# Patient Record
Sex: FEMALE | Race: WHITE
[De-identification: names, ages, dates, MRNs, and addresses within clinical notes are randomized per-mention and may not be internally consistent; named-entity substitution may affect disease eponyms.]

---

## 2023-09-07 ENCOUNTER — OFFICE VISIT (OUTPATIENT)
Facility: CLINIC | Age: 88
End: 2023-09-07
Payer: MEDICARE

## 2023-09-07 VITALS
WEIGHT: 96.4 LBS | SYSTOLIC BLOOD PRESSURE: 114 MMHG | DIASTOLIC BLOOD PRESSURE: 75 MMHG | OXYGEN SATURATION: 100 % | RESPIRATION RATE: 16 BRPM | TEMPERATURE: 97.4 F | HEART RATE: 64 BPM

## 2023-09-07 DIAGNOSIS — D64.9 ANEMIA, UNSPECIFIED TYPE: ICD-10-CM

## 2023-09-07 DIAGNOSIS — R35.0 INCREASED URINARY FREQUENCY: ICD-10-CM

## 2023-09-07 DIAGNOSIS — T14.8XXA BRUISING: ICD-10-CM

## 2023-09-07 DIAGNOSIS — R19.5 DARK STOOLS: ICD-10-CM

## 2023-09-07 DIAGNOSIS — E55.9 VITAMIN D DEFICIENCY: ICD-10-CM

## 2023-09-07 DIAGNOSIS — R63.4 WEIGHT LOSS: Primary | ICD-10-CM

## 2023-09-07 DIAGNOSIS — R35.1 NOCTURIA: ICD-10-CM

## 2023-09-07 DIAGNOSIS — Z13.220 SCREENING, LIPID: ICD-10-CM

## 2023-09-07 DIAGNOSIS — E53.8 VITAMIN B12 DEFICIENCY: ICD-10-CM

## 2023-09-07 PROCEDURE — 99204 OFFICE O/P NEW MOD 45 MIN: CPT | Performed by: FAMILY MEDICINE

## 2023-09-07 PROCEDURE — 1123F ACP DISCUSS/DSCN MKR DOCD: CPT | Performed by: FAMILY MEDICINE

## 2023-09-07 NOTE — PROGRESS NOTES
Silas Livingston is a 80 y.o. female who presents to the office today for the following:  Chief Complaint   Patient presents with    New Patient       Allergies   Allergen Reactions    Ciprofloxacin     Pcn [Penicillins] Other (See Comments)    Sulfa Antibiotics Other (See Comments)       No current outpatient medications on file. No past medical history on file. No past surgical history on file. Social History     Socioeconomic History    Marital status: Single     Spouse name: Not on file    Number of children: Not on file    Years of education: Not on file    Highest education level: Not on file   Occupational History    Not on file   Tobacco Use    Smoking status: Never    Smokeless tobacco: Never   Substance and Sexual Activity    Alcohol use: Never    Drug use: Never    Sexual activity: Not on file   Other Topics Concern    Not on file   Social History Narrative    Not on file     Social Determinants of Health     Financial Resource Strain: Not on file   Food Insecurity: Not on file   Transportation Needs: Not on file   Physical Activity: Not on file   Stress: Not on file   Social Connections: Not on file   Intimate Partner Violence: Not on file   Housing Stability: Not on file     or  Social History     Tobacco Use   Smoking Status Never   Smokeless Tobacco Never       No family history on file. HPI:  HPI  Patient is here to establish care. Here with daughter in law. Notes that she has been having decreased energy has increased bruising in her left arm. And was found to have weeping in her legs in June. Was evaluated in the hospital and EKG and echo were normal.  Was found to have iron deficiency and was given an iron transfusion and transfusion. Her hemoglobin level was 7. It went down to as low as a 5 and went up to an 8. Colonoscopy was recommended but she declined having it done at her age. She notes that she has been eating 3 meals a day but still continues to lose weight.   .  Also

## 2023-09-13 ENCOUNTER — HOSPITAL ENCOUNTER (OUTPATIENT)
Facility: HOSPITAL | Age: 88
Discharge: HOME | End: 2023-09-16
Payer: MEDICARE

## 2023-09-13 DIAGNOSIS — E55.9 VITAMIN D DEFICIENCY: ICD-10-CM

## 2023-09-13 DIAGNOSIS — R63.4 WEIGHT LOSS: ICD-10-CM

## 2023-09-13 DIAGNOSIS — R35.0 INCREASED URINARY FREQUENCY: ICD-10-CM

## 2023-09-13 DIAGNOSIS — D64.9 ANEMIA, UNSPECIFIED TYPE: ICD-10-CM

## 2023-09-13 DIAGNOSIS — E53.8 VITAMIN B12 DEFICIENCY: ICD-10-CM

## 2023-09-13 DIAGNOSIS — T14.8XXA BRUISING: ICD-10-CM

## 2023-09-13 LAB
ALBUMIN SERPL-MCNC: 2.4 G/DL (ref 3.4–5)
ALBUMIN/GLOB SERPL: 0.6 (ref 0.8–1.7)
ALP SERPL-CCNC: 95 U/L (ref 45–117)
ALT SERPL-CCNC: 12 U/L (ref 13–56)
ANION GAP SERPL CALC-SCNC: 5 MMOL/L (ref 3–18)
APPEARANCE UR: CLEAR
APTT PPP: 33.2 SEC (ref 23–36.4)
AST SERPL-CCNC: 15 U/L (ref 10–38)
BACTERIA URNS QL MICRO: NEGATIVE /HPF
BILIRUB SERPL-MCNC: 0.3 MG/DL (ref 0.2–1)
BILIRUB UR QL: NEGATIVE
BUN SERPL-MCNC: 10 MG/DL (ref 7–18)
BUN/CREAT SERPL: 22 (ref 12–20)
CALCIUM SERPL-MCNC: 8.1 MG/DL (ref 8.5–10.1)
CHLORIDE SERPL-SCNC: 100 MMOL/L (ref 100–111)
CO2 SERPL-SCNC: 29 MMOL/L (ref 21–32)
COLOR UR: YELLOW
CREAT SERPL-MCNC: 0.46 MG/DL (ref 0.6–1.3)
EPITH CASTS URNS QL MICRO: ABNORMAL /LPF (ref 0–5)
ERYTHROCYTE [DISTWIDTH] IN BLOOD BY AUTOMATED COUNT: 23.1 % (ref 11.6–14.5)
FOLATE SERPL-MCNC: 17 NG/ML (ref 3.1–17.5)
GLOBULIN SER CALC-MCNC: 3.7 G/DL (ref 2–4)
GLUCOSE SERPL-MCNC: 106 MG/DL (ref 74–99)
GLUCOSE UR STRIP.AUTO-MCNC: NEGATIVE MG/DL
HCT VFR BLD AUTO: 29.7 % (ref 35–45)
HGB BLD-MCNC: 8.6 G/DL (ref 12–16)
HGB UR QL STRIP: NEGATIVE
INR PPP: 1 (ref 0.9–1.1)
IRON SATN MFR SERPL: 8 % (ref 20–50)
IRON SERPL-MCNC: 12 UG/DL (ref 50–175)
KETONES UR QL STRIP.AUTO: NEGATIVE MG/DL
LEUKOCYTE ESTERASE UR QL STRIP.AUTO: ABNORMAL
MCH RBC QN AUTO: 20.9 PG (ref 24–34)
MCHC RBC AUTO-ENTMCNC: 29 G/DL (ref 31–37)
MCV RBC AUTO: 72.3 FL (ref 78–100)
NITRITE UR QL STRIP.AUTO: NEGATIVE
NRBC # BLD: 0 K/UL (ref 0–0.01)
NRBC BLD-RTO: 0 PER 100 WBC
PH UR STRIP: 7 (ref 5–8)
PLATELET # BLD AUTO: 409 K/UL (ref 135–420)
PMV BLD AUTO: 8.5 FL (ref 9.2–11.8)
POTASSIUM SERPL-SCNC: 4.3 MMOL/L (ref 3.5–5.5)
PROT SERPL-MCNC: 6.1 G/DL (ref 6.4–8.2)
PROT UR STRIP-MCNC: NEGATIVE MG/DL
PROTHROMBIN TIME: 13.6 SEC (ref 11.9–14.7)
RBC # BLD AUTO: 4.11 M/UL (ref 4.2–5.3)
RBC #/AREA URNS HPF: ABNORMAL /HPF (ref 0–5)
SODIUM SERPL-SCNC: 134 MMOL/L (ref 136–145)
SP GR UR REFRACTOMETRY: 1.01 (ref 1–1.03)
TIBC SERPL-MCNC: 156 UG/DL (ref 250–450)
TSH SERPL DL<=0.05 MIU/L-ACNC: 0.48 UIU/ML (ref 0.36–3.74)
UROBILINOGEN UR QL STRIP.AUTO: 1 EU/DL (ref 0.2–1)
VIT B12 SERPL-MCNC: 1105 PG/ML (ref 211–911)
WBC # BLD AUTO: 8.1 K/UL (ref 4.6–13.2)
WBC URNS QL MICRO: ABNORMAL /HPF (ref 0–4)

## 2023-09-13 PROCEDURE — 83540 ASSAY OF IRON: CPT

## 2023-09-13 PROCEDURE — 82306 VITAMIN D 25 HYDROXY: CPT

## 2023-09-13 PROCEDURE — 82746 ASSAY OF FOLIC ACID SERUM: CPT

## 2023-09-13 PROCEDURE — 85730 THROMBOPLASTIN TIME PARTIAL: CPT

## 2023-09-13 PROCEDURE — 84443 ASSAY THYROID STIM HORMONE: CPT

## 2023-09-13 PROCEDURE — 80053 COMPREHEN METABOLIC PANEL: CPT

## 2023-09-13 PROCEDURE — 36415 COLL VENOUS BLD VENIPUNCTURE: CPT

## 2023-09-13 PROCEDURE — 87086 URINE CULTURE/COLONY COUNT: CPT

## 2023-09-13 PROCEDURE — 85027 COMPLETE CBC AUTOMATED: CPT

## 2023-09-13 PROCEDURE — 83550 IRON BINDING TEST: CPT

## 2023-09-13 PROCEDURE — 82607 VITAMIN B-12: CPT

## 2023-09-13 PROCEDURE — 81001 URINALYSIS AUTO W/SCOPE: CPT

## 2023-09-13 PROCEDURE — 85610 PROTHROMBIN TIME: CPT

## 2023-09-14 LAB
25(OH)D3 SERPL-MCNC: 39.1 NG/ML (ref 30–100)
BACTERIA SPEC CULT: NORMAL
SERVICE CMNT-IMP: NORMAL

## 2023-09-15 DIAGNOSIS — D64.9 ANEMIA, UNSPECIFIED TYPE: Primary | ICD-10-CM

## 2023-09-18 ENCOUNTER — TELEPHONE (OUTPATIENT)
Facility: CLINIC | Age: 88
End: 2023-09-18

## 2023-09-18 NOTE — TELEPHONE ENCOUNTER
Pt daughter stated a message was left on Friday with labs results.  Ms Alem Kaba would like to be contacted after 2pm to discuss the labs at [phone]

## 2023-09-22 DIAGNOSIS — E55.9 VITAMIN D DEFICIENCY: Primary | ICD-10-CM

## 2023-09-26 RX ORDER — LEVOTHYROXINE SODIUM 0.1 MG/1
100 TABLET ORAL DAILY
Qty: 30 TABLET | Refills: 5 | Status: SHIPPED | OUTPATIENT
Start: 2023-09-26

## 2023-09-26 RX ORDER — ERGOCALCIFEROL 1.25 MG/1
50000 CAPSULE ORAL WEEKLY
Qty: 12 CAPSULE | Refills: 1 | Status: SHIPPED | OUTPATIENT
Start: 2023-09-26

## 2023-10-03 ENCOUNTER — TELEPHONE (OUTPATIENT)
Facility: CLINIC | Age: 88
End: 2023-10-03

## 2023-10-03 NOTE — TELEPHONE ENCOUNTER
----- Message from Arun Godinez sent at 10/3/2023 10:24 AM EDT -----  Subject: Message to Provider    QUESTIONS  Information for Provider? Patients daughter, Joseph Nur, is wanting a call back   after Jacquelyn's medications have been sent to [facility]. The 2 medication   refill requests have been put in for Gabapentin and Metoprolol.   ---------------------------------------------------------------------------  --------------  Hugo Ji [facility]  [phone]; OK to leave message on voicemail,OK to respond with electronic   message via [employer] portal (only for patients who have registered [employer]   account)  ---------------------------------------------------------------------------  --------------  SCRIPT ANSWERS  Relationship to Patient? Other/Third Party  Representative Name? Joseph Nur   Is the representative on the Communication Release of Information (DANYA)   form in Epic?  Yes

## 2023-10-04 ENCOUNTER — TELEPHONE (OUTPATIENT)
Facility: CLINIC | Age: 88
End: 2023-10-04

## 2023-10-04 DIAGNOSIS — G63 NEUROPATHY ASSOCIATED WITH DYSPROTEINEMIAS (HCC): Primary | ICD-10-CM

## 2023-10-04 DIAGNOSIS — E88.09 NEUROPATHY ASSOCIATED WITH DYSPROTEINEMIAS (HCC): Primary | ICD-10-CM

## 2023-10-04 DIAGNOSIS — I10 PRIMARY HYPERTENSION: ICD-10-CM

## 2023-10-04 RX ORDER — METOPROLOL TARTRATE 50 MG/1
50 TABLET, FILM COATED ORAL DAILY
Qty: 90 TABLET | Refills: 1 | Status: SHIPPED | OUTPATIENT
Start: 2023-10-04 | End: 2023-10-06

## 2023-10-04 RX ORDER — METOPROLOL TARTRATE 50 MG/1
50 TABLET, FILM COATED ORAL DAILY
Qty: 30 TABLET | Refills: 0 | Status: SHIPPED | OUTPATIENT
Start: 2023-10-04 | End: 2023-10-04 | Stop reason: SDUPTHER

## 2023-10-04 RX ORDER — METOPROLOL TARTRATE 50 MG/1
TABLET, FILM COATED ORAL
COMMUNITY
End: 2023-10-04 | Stop reason: SDUPTHER

## 2023-10-04 RX ORDER — METOPROLOL TARTRATE 50 MG/1
50 TABLET, FILM COATED ORAL DAILY
Qty: 90 TABLET | Refills: 0 | Status: CANCELLED | OUTPATIENT
Start: 2023-10-04

## 2023-10-04 NOTE — TELEPHONE ENCOUNTER
----- Message from Medardo Lloyd sent at 10/3/2023  5:03 PM EDT -----  Subject: Refill Request    QUESTIONS  Name of Medication? Other - Metoprolol Succ ER 50 mg  Patient-reported dosage and instructions? 1 tablet daily  How many days do you have left? 0  Preferred Pharmacy? [address] [facility] #[id number]  Pharmacy phone number (if available)? [phone]  Additional Information for Provider? Pt is completely out of this   medication. Please advise.   ---------------------------------------------------------------------------  --------------,  Name of Medication? Other - Gabapentin 300 mg  Patient-reported dosage and instructions? 1 tablet daily  How many days do you have left? 0  Preferred Pharmacy? [address] [place] [address] #[id number]  Pharmacy phone number (if available)? [phone]  Additional Information for Provider? Pt is completely out of this   medication. Please advise.   ---------------------------------------------------------------------------  --------------  CALL BACK INFO  What is the best way for the office to contact you? OK to leave message on   voicemail  Preferred Call Back Phone Number? [phone]  ---------------------------------------------------------------------------  --------------  SCRIPT ANSWERS  Relationship to Patient? Other/Third Party  Representative Name? [employer]  Is the representative on the Communication Release of Information (DANYA)   form in Epic?  Yes

## 2023-10-04 NOTE — TELEPHONE ENCOUNTER
Ms Layla Castañeda, the daughter of this patient is requesting to be contacted by a nurse when the 2 prescriptions she requested via [address] has been placed to [address] so that she will know when to go pick it up. Ms Petar Venegas has been without these medications for 2 days and will need an emergency amount until a prescription can be sent to the original pharmacy [facility]    Ms Susanna Schwab stated that she has to call the office to get info on the status for this patient, that no one is contacting back.  Please follow up when available at [phone] or [phone] please leave a voice mail if Ms Layla Castañeda is not available to answer the call    Pt has a VV scheduled for tomorrow 10/05    Gabapentin   Metoprolol

## 2023-10-05 ENCOUNTER — TELEMEDICINE (OUTPATIENT)
Facility: CLINIC | Age: 88
End: 2023-10-05
Payer: MEDICARE

## 2023-10-05 DIAGNOSIS — R53.81 PHYSICAL DECONDITIONING: Primary | ICD-10-CM

## 2023-10-05 PROCEDURE — 99203 OFFICE O/P NEW LOW 30 MIN: CPT | Performed by: FAMILY MEDICINE

## 2023-10-05 SDOH — ECONOMIC STABILITY: INCOME INSECURITY: HOW HARD IS IT FOR YOU TO PAY FOR THE VERY BASICS LIKE FOOD, HOUSING, MEDICAL CARE, AND HEATING?: NOT HARD AT ALL

## 2023-10-05 SDOH — ECONOMIC STABILITY: FOOD INSECURITY: WITHIN THE PAST 12 MONTHS, YOU WORRIED THAT YOUR FOOD WOULD RUN OUT BEFORE YOU GOT MONEY TO BUY MORE.: NEVER TRUE

## 2023-10-05 SDOH — ECONOMIC STABILITY: FOOD INSECURITY: WITHIN THE PAST 12 MONTHS, THE FOOD YOU BOUGHT JUST DIDN'T LAST AND YOU DIDN'T HAVE MONEY TO GET MORE.: NEVER TRUE

## 2023-10-05 SDOH — ECONOMIC STABILITY: HOUSING INSECURITY
IN THE LAST 12 MONTHS, WAS THERE A TIME WHEN YOU DID NOT HAVE A STEADY PLACE TO SLEEP OR SLEPT IN A SHELTER (INCLUDING NOW)?: NO

## 2023-10-05 ASSESSMENT — PATIENT HEALTH QUESTIONNAIRE - PHQ9
SUM OF ALL RESPONSES TO PHQ QUESTIONS 1-9: 0
SUM OF ALL RESPONSES TO PHQ9 QUESTIONS 1 & 2: 0
1. LITTLE INTEREST OR PLEASURE IN DOING THINGS: 0
SUM OF ALL RESPONSES TO PHQ QUESTIONS 1-9: 0
2. FEELING DOWN, DEPRESSED OR HOPELESS: 0
SUM OF ALL RESPONSES TO PHQ QUESTIONS 1-9: 0
SUM OF ALL RESPONSES TO PHQ QUESTIONS 1-9: 0

## 2023-10-05 NOTE — PROGRESS NOTES
number. #3. Increased urinary frequency  Recommend not drinking too much fluids late at night or after 5. But medications are unlikely to contribute to it. And last urine culture was negative. Discussed foods that may be irritants    4..  Iron deficiency  Follow-up with Dr. Champ Felder hematologist next week. Follow-up in 3 months in regards to nutrition and deconditioning. Jen Santos, was evaluated through a synchronous (real-time) audio-video encounter. The patient (or guardian if applicable) is aware that this is a billable service, which includes applicable co-pays. This Virtual Visit was conducted with patient's (and/or legal guardian's) consent. Patient identification was verified, and a caregiver was present when appropriate. The patient was located at Home: [address] [facility]  Provider was located at [facility] (Appt Dept): [address] [place] [address]  [address]        Total time spent on this encounter:  [address] on 10/5/2023 at 5:23 PM    An electronic signature was used to authenticate this note.

## 2023-10-06 DIAGNOSIS — I10 PRIMARY HYPERTENSION: Primary | ICD-10-CM

## 2023-10-06 RX ORDER — METOPROLOL SUCCINATE 50 MG/1
50 TABLET, EXTENDED RELEASE ORAL DAILY
Qty: 90 TABLET | Refills: 1 | Status: SHIPPED | OUTPATIENT
Start: 2023-10-06 | End: 2023-12-07 | Stop reason: SDUPTHER

## 2023-10-12 ENCOUNTER — HOME HEALTH ADMISSION (OUTPATIENT)
Age: 88
End: 2023-10-12
Payer: MEDICARE

## 2023-10-13 ENCOUNTER — HOME CARE VISIT (OUTPATIENT)
Age: 88
End: 2023-10-13
Payer: MEDICARE

## 2023-10-13 VITALS
HEIGHT: 60 IN | HEART RATE: 60 BPM | RESPIRATION RATE: 18 BRPM | DIASTOLIC BLOOD PRESSURE: 62 MMHG | OXYGEN SATURATION: 93 % | BODY MASS INDEX: 19.83 KG/M2 | TEMPERATURE: 97.4 F | SYSTOLIC BLOOD PRESSURE: 102 MMHG | WEIGHT: 101 LBS

## 2023-10-13 PROCEDURE — G0151 HHCP-SERV OF PT,EA 15 MIN: HCPCS

## 2023-10-13 ASSESSMENT — ENCOUNTER SYMPTOMS
BOWEL INCONTINENCE: 1
DYSPNEA ACTIVITY LEVEL: AFTER AMBULATING 10 - 20 FT
PAIN LOCATION - PAIN QUALITY: SORENESS

## 2023-10-17 ENCOUNTER — HOME CARE VISIT (OUTPATIENT)
Age: 88
End: 2023-10-17
Payer: MEDICARE

## 2023-10-17 PROCEDURE — G0152 HHCP-SERV OF OT,EA 15 MIN: HCPCS

## 2023-10-17 PROCEDURE — G0157 HHC PT ASSISTANT EA 15: HCPCS

## 2023-10-18 VITALS
HEART RATE: 67 BPM | OXYGEN SATURATION: 99 % | TEMPERATURE: 98.4 F | DIASTOLIC BLOOD PRESSURE: 62 MMHG | RESPIRATION RATE: 17 BRPM | SYSTOLIC BLOOD PRESSURE: 98 MMHG

## 2023-10-18 VITALS
DIASTOLIC BLOOD PRESSURE: 50 MMHG | RESPIRATION RATE: 6 BRPM | TEMPERATURE: 97.2 F | SYSTOLIC BLOOD PRESSURE: 95 MMHG | HEART RATE: 62 BPM | OXYGEN SATURATION: 96 %

## 2023-10-19 ENCOUNTER — HOME CARE VISIT (OUTPATIENT)
Age: 88
End: 2023-10-19
Payer: MEDICARE

## 2023-10-19 PROCEDURE — G0157 HHC PT ASSISTANT EA 15: HCPCS

## 2023-10-19 NOTE — HOME HEALTH
SUBJECTIVE: Patient states that she is doing well today. Notes that she has no new complaints or concerns. Denies any falls or changes in medications at this time. CAREGIVER INVOLVEMENT/ASSISTANCE NEEDED FOR: Patient is currently reliant on assistance for completion of most to all ADL's such as cooking, cleaning, bathing and dressing. OBJECTIVE:  See interventions. PATIENT RESPONSE TO TREATMENT:  Pt encouraged by her tolerance to PT today, she was able to participate more in PT than she had previously thought possible. Reported improved confidence following PT today. PATIENT LEVEL OF UNDERSTANDING OF EDUCATION PROVIDED: Pt provided education on importance of HEP and how often to complete to increase strength and decrease overall stiffness. Educated on signs and sx of infection and steps to take if one were to arise. Educated on importance of decreasing chance and bed sore and to make sure she is performing weight shifting every 1-2 hours. ASSESSMENT OF PROGRESS TOWARD GOALS: PAtient was seen for PT follow up session for LE strengthening, gait training and transfer training. Gait completed inside of home with use of FWW on level surfaces >100+ ft before rest breakn was required. Patient perfoming all aspects of bed mobility with MOD I at this time. HOME EXERCISE PROGRAM: Patient given written instructions on HEP and how often to complete to maintain compliance. With gait training to be completed once every hour with supervision from son. THE FOLLOWING DISCHARGE PLANNING WAS DISCUSSED WITH THE PATIENT/CAREGIVER: Patient to be D/C from [address] [place] [address],[address] PT when all goals have been met or progressed well towards. PLAN FOR NEXT VISIT: Cont PT PoC with focus on progression of LE strengthening, gait training and transfer training.

## 2023-10-19 NOTE — CASE COMMUNICATION
[facility]    [address] [facility] [address]    Phone:  [phone]    Fax:  [phone]       Thank You for choosing us for your health care visit. We are glad to serve you and happy to provide you with this summary of your visit. Please help us to ensure we have accurate records. If you find anything that needs to be changed, please let our staff know as soon as possible.          Your Demographic Information     Patient Name Sex Rohit Anders Male 1959       Ethnic Group Patient Race    Not of  or  Origin White      Your Visit Details     Date & Time Provider Department    2017 3:00 PM Blaise Brooke MD [facility]      Your Upcoming Appointment*(Max 10)       3:45 PM CDT   Follow-up Visit with Basim Snider DO   [facility] ([facility])    [address] [place] [address] [place] [address]   [phone]              Your To Do List     Follow-Up    Return in about 6 months (around 10/25/2017) for CPE.      Conditions Discussed Today or Order-Related Diagnoses        Comments    Depression, unspecified depression type    -  Primary     Benign essential hypertension         Tobacco dependence         Facial rash         Erectile dysfunction, unspecified erectile dysfunction type           Your Vitals Were     BP Pulse Temp Height Weight SpO2    122/84 (BP Location: [facility], Patient Position: Sitting, Cuff Size: Regular) 71 98 °F (36.7 °C) (Tympanic) 5' 9\" (1.753 m) 177 lb (80.3 kg) 95%    BMI Smoking Status                26.14 kg/m2 Current Every Day Smoker          Medications Prescribed or Re-Ordered Today     triamcinolone (ARISTOCORT) 0.1 % cream    Sig - Route: Apply 1 application topically 2 times daily as needed (rash). Indications: rash on face, forehead, elbow - Topical    Class: Eprescribe    Pt seen for [facility] OT evaluation 10/17/23. Reccomend HHOT services 2w2 to address safety and I in ADL shower tasks, caregiver training, fall prevention. Pt and caregivers agreeable to POC. Pt to DC home to self and family memebers under MD supervision once all goals are met or max LoF therapeutic benefit from skilled OT services achieved.     Thank you for this referral,  Patrice Kauffman, OT License Applicant Pharmacy: [id number] - [place], [place] - [address] AT [address] [facility] Ph #: [phone]    sildenafil (REVATIO) 20 MG tablet    Si pills prior to sex.    Class: Eprescribe    Pharmacy: [id number] - [place], [place] - [address] AT [address] [facility] Ph #: [phone]      Your Current Medications Are        Disp Refills Start End    triamcinolone (ARISTOCORT) 0.1 % cream 30 g 11 2017     Sig - Route: Apply 1 application topically 2 times daily as needed (rash). Indications: rash on face, forehead, elbow - Topical    Class: Eprescribe    lisinopril-hydrochlorothiazide (PRINZIDE,ZESTORETIC) 20-25 MG per tablet 30 tablet 11 2017     Sig - Route: Take 1 tablet by mouth daily. - Oral    Class: Eprescribe    buPROPion (WELLBUTRIN XL) 300 MG 24 hr tablet 30 tablet 11 2017     Sig - Route: Take 1 tablet by mouth daily. - Oral    Class: Eprescribe    sildenafil (REVATIO) 20 MG tablet 10 tablet 11 2017     Si pills prior to sex.    Class: Eprescribe      Discontinued Medications        Reason for Discontinue    tadalafil (CIALIS) 20 MG tablet Not Needed      Allergies     No Known Allergies      Immunizations History as of 2017     Name Date    Tdap 9/10/2010      Problem List as of 2017     Depression    Tobacco dependence    Benign essential hypertension    Hyperlipidemia    Erectile dysfunction    Primary osteoarthritis of left knee    Aftercare following left knee joint replacement surgery    Status post total left knee replacement            Patient Instructions     None

## 2023-10-23 VITALS
SYSTOLIC BLOOD PRESSURE: 94 MMHG | DIASTOLIC BLOOD PRESSURE: 60 MMHG | HEART RATE: 64 BPM | OXYGEN SATURATION: 97 % | TEMPERATURE: 97.2 F | RESPIRATION RATE: 17 BRPM

## 2023-10-23 NOTE — HOME HEALTH
SUBJECTIVE: Patient states that she is doing well today. Notes that she has no new complaints or concerns. Denies any falls or changes in medications at this time. Reports that she was very sore following the previous session. Points to her arm as area of soreness. CAREGIVER INVOLVEMENT/ASSISTANCE NEEDED FOR: Patient is currently reliant on assistance for completion of most to all ADL's such as cooking, cleaning, bathing and dressing. OBJECTIVE:  See interventions. PATIENT RESPONSE TO TREATMENT:  Pt encouraged by her tolerance to PT today, she was able to participate more in PT than she had previously thought possible. Reported improved confidence following PT today. PATIENT LEVEL OF UNDERSTANDING OF EDUCATION PROVIDED: Caregiver provided education on proper use and importance of regularly checking patients blood pressure. Caregiver able to demo proper use after education. ASSESSMENT OF PROGRESS TOWARD GOALS: PAtient was seen for PT follow up session for LE strengthening, gait training and transfer training. Gait completed inside of home with use of FWW on level surfaces >100+ ft before rest breakn was required. Patient perfoming all aspects of bed mobility with MOD I at this time. Despite being more sore today than previous sessions patient was able to complete all therex asked of her, with completion of new standing therex today. HOME EXERCISE PROGRAM: Patient given written instructions on HEP and how often to complete to maintain compliance. With gait training to be completed once every hour with supervision from son. THE FOLLOWING DISCHARGE PLANNING WAS DISCUSSED WITH THE PATIENT/CAREGIVER: Patient to be D/C from [facility] PT when all goals have been met or progressed well towards. PLAN FOR NEXT VISIT: Cont PT PoC with focus on progression of LE strengthening, gait training and transfer training.

## 2023-10-24 ENCOUNTER — HOME CARE VISIT (OUTPATIENT)
Age: 88
End: 2023-10-24
Payer: MEDICARE

## 2023-10-24 VITALS
TEMPERATURE: 97.6 F | DIASTOLIC BLOOD PRESSURE: 70 MMHG | RESPIRATION RATE: 17 BRPM | HEART RATE: 67 BPM | SYSTOLIC BLOOD PRESSURE: 110 MMHG | OXYGEN SATURATION: 97 %

## 2023-10-24 PROCEDURE — G0157 HHC PT ASSISTANT EA 15: HCPCS

## 2023-10-24 PROCEDURE — G0152 HHCP-SERV OF OT,EA 15 MIN: HCPCS

## 2023-10-24 NOTE — HOME HEALTH
SUBJECTIVE: Pt presents home with son today and reports no pain. No other new/acute complaints reported. Pt wearing pajamas and reports she forgot therapy was coming today. CAREGIVER INVOLVEMENT/ASSISTANCE NEEDED FOR: IADLs, safety during bathing, medication management, transportation, community mobility     OBJECTIVE:  See interventions. PATIENT EDUCATION PROVIDED THIS VISIT: See interventions for goal-specific education provided. PATIENT RESPONSE TO EDUCATION PROVIDED: Patient/Caregiver response to education: Verbalized understanding, partial teach-back. PATIENT RESPONSE TO TREATMENT: Pt tolerated treatment well as evidenced by vitals remained stable throughout session. No reports of increased pain/discomfort throughout session. No reports of dizziness, lightheadedness, weakness, etc. Pt left sitting comfortably on couch with all needs met. ASSESSMENT OF PROGRESS TOWARD GOALS:   Pt progressing well towards current HHOT goal objectives as demonstrated by good ability to demonstrate HEP back to therapist after instruction, SUP for item retrieval durnig ADL dressing tasks, and reported carryover of increased initation and engagement during ADLs outside of skilled [address] [place] [address]. Continued skilled care needed to address deficits in bathing, LBD, home safety, fall risk, and decreased strength/activity tolerance for max return to PLOF. PLAN FOR NEXT VISIT: Bathing retraining with pt and CG, LBD retraining, HEP reinforcement  THE FOLLOWING DISCHARGE PLANNING WAS DISCUSSED WITH THE PATIENT/CAREGIVER: Oriented pt and CG to x1 more OT visit this week, x1 OT visit next week for DC and they affirmed understanding.

## 2023-10-25 ENCOUNTER — HOME CARE VISIT (OUTPATIENT)
Age: 88
End: 2023-10-25
Payer: MEDICARE

## 2023-10-25 VITALS
DIASTOLIC BLOOD PRESSURE: 60 MMHG | TEMPERATURE: 97.4 F | HEART RATE: 72 BPM | RESPIRATION RATE: 17 BRPM | SYSTOLIC BLOOD PRESSURE: 90 MMHG | OXYGEN SATURATION: 98 %

## 2023-10-25 VITALS
RESPIRATION RATE: 17 BRPM | HEART RATE: 67 BPM | TEMPERATURE: 98.6 F | OXYGEN SATURATION: 95 % | DIASTOLIC BLOOD PRESSURE: 60 MMHG | SYSTOLIC BLOOD PRESSURE: 110 MMHG

## 2023-10-25 PROCEDURE — G0152 HHCP-SERV OF OT,EA 15 MIN: HCPCS

## 2023-10-25 NOTE — HOME HEALTH
SUBJECTIVE: Pt presents home with her son today, about to make her bed. Pt reports no pain. No other new/acute complaints reported. Pt is pleasant and agreeable to session. CAREGIVER INVOLVEMENT/ASSISTANCE NEEDED FOR: Higher level IADLs, medication management, transportation    OBJECTIVE:  See interventions. PATIENT EDUCATION PROVIDED THIS VISIT: See interventions for goal-specific education provided. PATIENT RESPONSE TO EDUCATION PROVIDED: Patient/Caregiver response to education: Verbalized understanding, partial teach-back. PATIENT RESPONSE TO TREATMENT: Pt tolerated treatment well as evidenced by vitals remained stable throughout session. No reports of increased pain/discomfort throughout session. No reports of dizziness, lightheadedness, weakness, etc. Pt left sitting comfortably on couch with son in attendence and with all needs met. No further concerns reported today. ASSESSMENT OF PROGRESS TOWARD GOALS: Pt progressing excellently towards current HHOT goal objectives as evidenced by increased ability to engage in ADL and IADL tasks without rest breaks or SOB today, improved pacing during IADLs, good safety with shower transfers/DME training, and ability to demonstrate back HEP with good carryover and improved form compared to yesterday. PLAN FOR NEXT VISIT: IADL retraining, caregiver education, OT DC  THE FOLLOWING DISCHARGE PLANNING WAS DISCUSSED WITH THE PATIENT/CAREGIVER: Oriented pt and son to OT DC next session and they both affirmed understanding.

## 2023-10-26 ENCOUNTER — HOME CARE VISIT (OUTPATIENT)
Age: 88
End: 2023-10-26
Payer: MEDICARE

## 2023-10-31 ENCOUNTER — HOME CARE VISIT (OUTPATIENT)
Age: 88
End: 2023-10-31
Payer: MEDICARE

## 2023-10-31 VITALS
TEMPERATURE: 97.8 F | HEART RATE: 57 BPM | RESPIRATION RATE: 17 BRPM | SYSTOLIC BLOOD PRESSURE: 105 MMHG | DIASTOLIC BLOOD PRESSURE: 70 MMHG | OXYGEN SATURATION: 98 %

## 2023-10-31 PROCEDURE — G0152 HHCP-SERV OF OT,EA 15 MIN: HCPCS

## 2023-10-31 NOTE — HOME HEALTH
SUBJECTIVE: Patient states that she is doing well today. Notes that she has no new complaints or concerns. Denies any falls or changes in medications at this time. Reports that she was very sore following the previous session. Points to her arm as area of soreness. CAREGIVER INVOLVEMENT/ASSISTANCE NEEDED FOR: Patient is currently reliant on assistance for completion of most to all ADL's such as cooking, cleaning, bathing and dressing. OBJECTIVE:  See interventions. PATIENT RESPONSE TO TREATMENT:  Pt encouraged by her tolerance to PT today, she was able to participate more in PT than she had previously thought possible. Reported improved confidence following PT today. PATIENT LEVEL OF UNDERSTANDING OF EDUCATION PROVIDED: Caregiver provided education on proper use and importance of regularly checking patients blood pressure. Caregiver able to demo proper use after education. ASSESSMENT OF PROGRESS TOWARD GOALS: PAtient was seen for PT follow up session for LE strengthening, gait training and transfer training. Gait completed inside of home with use of FWW on level surfaces >100+ ft before rest breakn was required. Patient perfoming all aspects of bed mobility with MOD I at this time. Despite being more sore today than previous sessions patient was able to complete all therex asked of her, with completion of new standing therex today. HOME EXERCISE PROGRAM: Patient given written instructions on HEP and how often to complete to maintain compliance. With gait training to be completed once every hour with supervision from son. THE FOLLOWING DISCHARGE PLANNING WAS DISCUSSED WITH THE PATIENT/CAREGIVER: Patient to be D/C from [facility] PT when all goals have been met or progressed well towards. PLAN FOR NEXT VISIT: Cont PT PoC with focus on progression of LE strengthening, gait training and transfer training.

## 2023-10-31 NOTE — CASE COMMUNICATION
[facility] Pt was seen for a course of x4 HHOT visit from 10/17/23 - 10/31/23. Care provided included extensive patient and family training and education, home exercise plan generation/teaching/reinforcement, ADL and IADL retraining, home safety education, functional transfer training, fall prevention training, and adaptive equipment education. Pt and CGs were highly involved throughout POC and pt has met all current HHOT goal objectives. Thank you.   Angel Barron, OT License Applicant

## 2023-11-01 ENCOUNTER — HOME CARE VISIT (OUTPATIENT)
Age: 88
End: 2023-11-01
Payer: MEDICARE

## 2023-11-01 VITALS
SYSTOLIC BLOOD PRESSURE: 98 MMHG | RESPIRATION RATE: 17 BRPM | TEMPERATURE: 97.6 F | OXYGEN SATURATION: 98 % | DIASTOLIC BLOOD PRESSURE: 54 MMHG | HEART RATE: 75 BPM

## 2023-11-01 PROCEDURE — G0157 HHC PT ASSISTANT EA 15: HCPCS

## 2023-11-02 ENCOUNTER — HOME CARE VISIT (OUTPATIENT)
Age: 88
End: 2023-11-02
Payer: MEDICARE

## 2023-11-07 ENCOUNTER — HOME CARE VISIT (OUTPATIENT)
Age: 88
End: 2023-11-07
Payer: MEDICARE

## 2023-11-07 PROCEDURE — G0157 HHC PT ASSISTANT EA 15: HCPCS

## 2023-11-07 NOTE — HOME HEALTH
SUBJECTIVE: Patient states that she is doing well today. Notes that she has no new complaints or concerns. Denies any falls or changes in medications at this time. Reports that she was very sore following the previous session. Points to her arm as area of soreness. CAREGIVER INVOLVEMENT/ASSISTANCE NEEDED FOR: Patient is currently reliant on assistance for completion of most to all ADL's such as cooking, cleaning, bathing and dressing. OBJECTIVE:  See interventions. PATIENT RESPONSE TO TREATMENT:  Pt encouraged by her tolerance to PT today, she was able to participate more in PT than she had previously thought possible. Reported improved confidence following PT today. PATIENT LEVEL OF UNDERSTANDING OF EDUCATION PROVIDED: Caregiver provided education on proper use and importance of regularly checking patients blood pressure. Caregiver able to demo proper use after education. ASSESSMENT OF PROGRESS TOWARD GOALS: PAtient was seen for PT follow up session for LE strengthening, gait training and transfer training. Gait completed inside of home with use of FWW on level surfaces >100+ ft before rest breakn was required. Patient perfoming all aspects of bed mobility with MOD I at this time. Despite being more sore today than previous sessions patient was able to complete all therex asked of her, with completion of new standing therex today. HOME EXERCISE PROGRAM: Patient given written instructions on HEP and how often to complete to maintain compliance. With gait training to be completed once every hour with supervision from son. THE FOLLOWING DISCHARGE PLANNING WAS DISCUSSED WITH THE PATIENT/CAREGIVER: Patient to be D/C from [address] [place] [address],[address] PT when all goals have been met or progressed well towards. PLAN FOR NEXT VISIT: Cont PT PoC with focus on progression of LE strengthening, gait training and transfer training.

## 2023-11-08 ENCOUNTER — HOME CARE VISIT (OUTPATIENT)
Age: 88
End: 2023-11-08
Payer: MEDICARE

## 2023-11-08 VITALS
OXYGEN SATURATION: 97 % | SYSTOLIC BLOOD PRESSURE: 104 MMHG | RESPIRATION RATE: 17 BRPM | DIASTOLIC BLOOD PRESSURE: 54 MMHG | TEMPERATURE: 98.4 F | HEART RATE: 74 BPM

## 2023-11-08 VITALS
OXYGEN SATURATION: 96 % | DIASTOLIC BLOOD PRESSURE: 58 MMHG | TEMPERATURE: 97.6 F | HEART RATE: 72 BPM | SYSTOLIC BLOOD PRESSURE: 104 MMHG | RESPIRATION RATE: 16 BRPM

## 2023-11-08 PROCEDURE — G0151 HHCP-SERV OF PT,EA 15 MIN: HCPCS

## 2023-11-08 NOTE — HOME HEALTH
SUBJECTIVE: Patient states that she is doing well today, notes that she has no new complaints or concerns. Denies any falls or chnages in medications at this time. Reports that she has progressed well in her time in PT. CAREGIVER INVOLVEMENT/ASSISTANCE NEEDED FOR: Pt currently able to assist in the completion of ADL's such as cooking, cleaning and dressing. OBJECTIVE:  See interventions. PATIENT RESPONSE TO TREATMENT:  Pt encouraged by her tolerance to PT today, she was able to participate more in PT than she had previously thought possible. Reported improved confidence following PT today. PATIENT LEVEL OF UNDERSTANDING OF EDUCATION PROVIDED: Pt and caregiver provided education today on D/C planning and expectations from outpatient PT. All questions answered upon leaving todays session. Both pt and caregiver verbalized understanding of all above. ASSESSMENT OF PROGRESS TOWARD GOALS: Strength/ROM-    MMT R LE hip flex 3+/5, hip abd 3+/5, hip ext 3+/5, knee flex 3+/5, knee ext 3+/5. ........ MMT L LE hip flex 3+/5, hip abd 3+/5, hip ext 3+/5, knee flex 3+/5, knee ext 3+/5  ADL's- Pt currently able to assist in the completion of ADL's such as cooking, cleaning and dressing. Functional Mobiliy- Pt currently able to complete gait training indoors and outdoors with SBA and with use of FWW AD on level and unelevel srufaces for distances greater than 300ft. Stair Mobility pt demonstrates MIN A x1 for handheld assist with use of single hand rail to enter and exit home. Demonstrates MOD I with transfers such as car, sit to stands, bed mobility and toilet transfers. Balance- Tinetti score today 20/28 with static and dynamic aspects of balance. Special tests- TUG- 36secs   2min walk test- able with use of FWW  Tinetti- 20/28  5x sit to stand- Unable at this time.   Reccomendations- D/C from [facility] PT and transition to OP PT as pt has met all goals at this time    [address] [facility] [facility] [address] [place] Patient is currently

## 2023-12-01 DIAGNOSIS — E55.9 VITAMIN D DEFICIENCY: ICD-10-CM

## 2023-12-01 RX ORDER — ERGOCALCIFEROL 1.25 MG/1
50000 CAPSULE ORAL WEEKLY
Qty: 12 CAPSULE | Refills: 1 | Status: SHIPPED | OUTPATIENT
Start: 2023-12-01

## 2023-12-01 RX ORDER — LEVOTHYROXINE SODIUM 0.1 MG/1
100 TABLET ORAL DAILY
Qty: 90 TABLET | Refills: 1 | Status: SHIPPED | OUTPATIENT
Start: 2023-12-01

## 2023-12-07 DIAGNOSIS — I10 PRIMARY HYPERTENSION: ICD-10-CM

## 2023-12-07 RX ORDER — PRAVASTATIN SODIUM 20 MG
20 TABLET ORAL DAILY
Qty: 90 TABLET | Refills: 1 | Status: SHIPPED | OUTPATIENT
Start: 2023-12-07

## 2023-12-07 RX ORDER — METOPROLOL SUCCINATE 50 MG/1
50 TABLET, EXTENDED RELEASE ORAL DAILY
Qty: 90 TABLET | Refills: 1 | Status: SHIPPED | OUTPATIENT
Start: 2023-12-07

## 2023-12-07 RX ORDER — AMLODIPINE BESYLATE 10 MG/1
10 TABLET ORAL DAILY
Qty: 90 TABLET | Refills: 1 | Status: SHIPPED | OUTPATIENT
Start: 2023-12-07

## 2023-12-07 NOTE — TELEPHONE ENCOUNTER
----- Message from Bishop Gandhi sent at 12/7/2023 12:08 PM EST -----  Regarding: Refill of medication  Contact: [phone]  I need refills for my Amlodipine 10 mg & for my Prevastatin SOD 20 mg. Please send those to [facility]. Thank you.

## 2023-12-08 DIAGNOSIS — E88.09 NEUROPATHY ASSOCIATED WITH DYSPROTEINEMIAS (HCC): ICD-10-CM

## 2023-12-08 DIAGNOSIS — G63 NEUROPATHY ASSOCIATED WITH DYSPROTEINEMIAS (HCC): ICD-10-CM

## 2023-12-08 RX ORDER — GABAPENTIN 300 MG/1
CAPSULE ORAL
Qty: 90 CAPSULE | Refills: 0 | Status: SHIPPED | OUTPATIENT
Start: 2023-12-08 | End: 2024-03-07